# Patient Record
Sex: FEMALE | Race: WHITE | NOT HISPANIC OR LATINO | URBAN - NONMETROPOLITAN AREA
[De-identification: names, ages, dates, MRNs, and addresses within clinical notes are randomized per-mention and may not be internally consistent; named-entity substitution may affect disease eponyms.]

---

## 2021-10-31 PROCEDURE — 99283 EMERGENCY DEPT VISIT LOW MDM: CPT

## 2021-11-01 ENCOUNTER — HOSPITAL ENCOUNTER (EMERGENCY)
Facility: HOSPITAL | Age: 66
Discharge: HOME | End: 2021-11-01
Attending: STUDENT IN AN ORGANIZED HEALTH CARE EDUCATION/TRAINING PROGRAM | Admitting: STUDENT IN AN ORGANIZED HEALTH CARE EDUCATION/TRAINING PROGRAM

## 2021-11-01 ENCOUNTER — APPOINTMENT (OUTPATIENT)
Dept: GENERAL RADIOLOGY | Facility: HOSPITAL | Age: 66
End: 2021-11-01

## 2021-11-01 ENCOUNTER — APPOINTMENT (OUTPATIENT)
Dept: CT IMAGING | Facility: HOSPITAL | Age: 66
End: 2021-11-01

## 2021-11-01 VITALS
HEART RATE: 60 BPM | SYSTOLIC BLOOD PRESSURE: 124 MMHG | TEMPERATURE: 98 F | BODY MASS INDEX: 36.8 KG/M2 | HEIGHT: 62 IN | WEIGHT: 200 LBS | OXYGEN SATURATION: 99 % | RESPIRATION RATE: 20 BRPM | DIASTOLIC BLOOD PRESSURE: 51 MMHG

## 2021-11-01 DIAGNOSIS — S01.81XA LACERATION OF FOREHEAD, INITIAL ENCOUNTER: Primary | ICD-10-CM

## 2021-11-01 DIAGNOSIS — M25.561 ACUTE PAIN OF RIGHT KNEE: ICD-10-CM

## 2021-11-01 PROCEDURE — 73562 X-RAY EXAM OF KNEE 3: CPT

## 2021-11-01 PROCEDURE — 71045 X-RAY EXAM CHEST 1 VIEW: CPT

## 2021-11-01 PROCEDURE — 70450 CT HEAD/BRAIN W/O DYE: CPT

## 2021-11-01 PROCEDURE — 73130 X-RAY EXAM OF HAND: CPT

## 2021-11-01 PROCEDURE — 90471 IMMUNIZATION ADMIN: CPT | Performed by: STUDENT IN AN ORGANIZED HEALTH CARE EDUCATION/TRAINING PROGRAM

## 2021-11-01 PROCEDURE — 90715 TDAP VACCINE 7 YRS/> IM: CPT | Performed by: STUDENT IN AN ORGANIZED HEALTH CARE EDUCATION/TRAINING PROGRAM

## 2021-11-01 PROCEDURE — 25010000002 TETANUS-DIPHTH-ACELL PERTUSSIS 5-2.5-18.5 LF-MCG/0.5 SUSPENSION PREFILLED SYRINGE: Performed by: STUDENT IN AN ORGANIZED HEALTH CARE EDUCATION/TRAINING PROGRAM

## 2021-11-01 PROCEDURE — 72125 CT NECK SPINE W/O DYE: CPT

## 2021-11-01 RX ORDER — CETIRIZINE HYDROCHLORIDE 10 MG/1
10 TABLET ORAL DAILY
COMMUNITY

## 2021-11-01 RX ORDER — ALPRAZOLAM 0.25 MG/1
0.25 TABLET ORAL AS NEEDED
COMMUNITY

## 2021-11-01 RX ORDER — CITALOPRAM 40 MG/1
40 TABLET ORAL DAILY
COMMUNITY

## 2021-11-01 RX ORDER — HYDROCODONE BITARTRATE AND ACETAMINOPHEN 10; 325 MG/1; MG/1
1 TABLET ORAL ONCE
Status: COMPLETED | OUTPATIENT
Start: 2021-11-01 | End: 2021-11-01

## 2021-11-01 RX ORDER — MULTIPLE VITAMINS W/ MINERALS TAB 9MG-400MCG
1 TAB ORAL DAILY
COMMUNITY

## 2021-11-01 RX ORDER — FLUTICASONE PROPIONATE 50 MCG
2 SPRAY, SUSPENSION (ML) NASAL DAILY
COMMUNITY

## 2021-11-01 RX ORDER — NAPROXEN 500 MG/1
500 TABLET ORAL AS NEEDED
COMMUNITY

## 2021-11-01 RX ORDER — OMEPRAZOLE 40 MG/1
40 CAPSULE, DELAYED RELEASE ORAL DAILY
COMMUNITY

## 2021-11-01 RX ADMIN — HYDROCODONE BITARTRATE AND ACETAMINOPHEN 1 TABLET: 10; 325 TABLET ORAL at 01:23

## 2021-11-01 RX ADMIN — TETANUS TOXOID, REDUCED DIPHTHERIA TOXOID AND ACELLULAR PERTUSSIS VACCINE, ADSORBED 0.5 ML: 5; 2.5; 8; 8; 2.5 SUSPENSION INTRAMUSCULAR at 01:24

## 2021-11-01 NOTE — DISCHARGE INSTRUCTIONS
You were evaluated in the ER for a fall. Your workup showed no indication at this time for admission to the hospital. Please use tylenol, ibuprofen, rest, ice, compression with an ACE bandage, and elevation of the leg for symptomatic improvement.     It is VERY IMPORTANT that you follow up (call them to set up an appointment) with your primary care doctor* within the next few days or as soon as possible so that you can be re-evaluated for improvement in your symptoms or for any other questions. If you were prescribed any medications, please take them as directed or call us back with any questions.     Return to the ER within 24-48 hours if you have any new symptoms, worsening symptoms, or any other concerns.    _____  *If you do not have a primary care doctor, follow up with one of the [facility] physician groups below to setup primary care.     If you have trouble making an appointment, please call the [facility] Nurse Line at [phone]    Dr. Zhane Rogers DO, Dr. Madhuri Keating DO, and LADAN Murray  [facility]  [address] [place] [address], [address] [phone]    Dr. Cal Ramírez MD  [facility] - [address] [address], [address], [place] [address] [phone]    Dr. Manjinder Collier DO, Dr. Olegario Pena DO,  LADAN Grajeda, and LADAN Carbajal  [facility] - [address] [address], [address], [place] [address] [phone]     Dr. Beckie Angel MD, and Gilda Ledbetter, ALDAN  [facility] - [address] , [place] [address] [phone]    Dr. Ilir Kimball MD and Dr. Grant Stover MD  [facility] [address], [address] [phone]    Dr. Eliseo Burden MD  [facility] -  [address], [facility], [place], [place], [address] [phone]    Dr. Cleve Bartlett MD  [address], [address] [phone]

## 2021-11-01 NOTE — ED NOTES
"Bacitracin 3x3 adaptic 4x4 and 6\" ace applied to right knee     Marilynn Boucher, RN  11/01/21 0411    "

## 2021-11-05 NOTE — ED PROVIDER NOTES
Subjective   HISTORY OF PRESENT ILLNESS     Viktoria Contreras is a 66 y.o. female, with a past medical history of GERD, depression, who presents to the emergency department today with a small laceration after a fall.  Patient states that she was walking when she missed a step and fell forward and hit her face and scraped her right knee.  States that she did not pass out.  States that she has some mild pain in her head.  States that she has not tried anything for this.  Denies any chest pain, shortness of breath, abdominal pain, dysuria, numbness, tingling.  Denies any other injuries at this time.  Denies any vision changes or any other neurological deficits.      History is obtained from the patient.     Review of systems:  All other review of systems are negative (other than the pertinent positives listed above).    Past Medical History:  Past Medical History:   Diagnosis Date   • Depression    • GERD (gastroesophageal reflux disease)    • Obese        Allergies:  Allergies   Allergen Reactions   • Demerol [Meperidine] Hives   • Glucophage [Metformin] Nausea And Vomiting   • Magnesium-Containing Compounds Dystonia   • Penicillins Seizure   • Sulfa Antibiotics Unknown - High Severity       Past Surgical History:  Past Surgical History:   Procedure Laterality Date   • HYSTERECTOMY         Family History:  History reviewed. No pertinent family history.    Social History:  Social History     Socioeconomic History   • Marital status:    Tobacco Use   • Smoking status: Never Smoker   • Smokeless tobacco: Never Used   Substance and Sexual Activity   • Alcohol use: Not Currently       Home Medications:  Prior to Admission medications    Medication Sig Start Date End Date Taking? Authorizing Provider   cetirizine (zyrTEC) 10 MG tablet Take 10 mg by mouth Daily.   Yes ProviderKadie MD   citalopram (CeleXA) 40 MG tablet Take 40 mg by mouth Daily.   Yes ProviderKadie MD   fluticasone (FLONASE) 50 MCG/ACT nasal  spray 2 sprays into the nostril(s) as directed by provider Daily.   Yes Kadie Gomez MD   multivitamin with minerals tablet tablet Take 1 tablet by mouth Daily.   Yes Kadie Gomez MD   naproxen (NAPROSYN) 500 MG tablet Take 500 mg by mouth As Needed for Mild Pain .   Yes Kadie Gomez MD   omeprazole (priLOSEC) 40 MG capsule Take 40 mg by mouth Daily.   Yes Kadie Gomez MD   ALPRAZolam (XANAX) 0.25 MG tablet Take 0.25 mg by mouth As Needed for Anxiety.    Kadie Gomez MD        Objective   PHYSICAL EXAM     Vitals:   Vitals:    11/01/21 0411   BP: 124/51   Pulse: 60   Resp: 20   Temp: 98 °F (36.7 °C)   SpO2: 99%     Nursing note reviewed.  GENERAL: Well appearing. Not in acute distress. Not ill-appearing. Not diaphoretic. Well nourished.  HEAD: 1cm laceration to right anterior forehead, normocephalic.  EARS: External ears normal in appearance without vesicles, hematomas, or erythema.  EYES: EOM grossly intact. PERRL. No scleral icterus or conjunctival injection.  NOSE: Normal in appearance.  THROAT: Moist. No oropharyngeal swelling, exudates, or erythema.   NECK: ROM normal. No cervical lymphadenopathy. No midline cervical spine tenderness. Supple.  CARDIAC: Rate normal, rhythm regular , no murmurs, 2+ peripheral pulses in all four extremities with normal capillary refill. No murmurs appreciated.  PULMONARY: Work of breathing normal, lungs are clear to auscultation bilaterally without wheezes, crackles, rhonchi.  ABDOMINAL: Abdomen is soft, non-tender, non-distended, no hepatomegaly or splenomegaly. No guarding. No rebound tenderness.  BACK: No mid-line spinal tenderness. No ecchymosis. No CVA tenderness bilaterally. No step-offs or deformities.  MUSCULOSKELETAL: Normal range of motion. No lower extremity edema. Negative carmencita's sign bilaterally. Abrasion to right knee. Small bruise to right pinky.  NEUROLOGIC: Alert and oriented x 3, Moving all four extremities with  normal strength.  SKIN: Warm and dry. No lacerations. No jaundice. No bruising.  PSYCHIATRIC: Mood and affect are normal.     LAB RESULTS     Lab Results (last 72 hours)     ** No results found for the last 72 hours. **        RADIOLOGY RESULTS     XR Hand 3+ View Right    Result Date: 11/1/2021  Narrative: XR HAND 3+ VW RIGHT- 11/1/2021 1:42 AM CDT  HISTORY: pinky finger injury  COMPARISON: None  FINDINGS: Frontal, lateral and oblique radiographs of the right hand were provided for review.  There is no acute fracture or joint subluxation. The soft tissues are normal in appearance. The joint spaces are maintained.      Impression: 1. No acute osseous injury or malalignment. This report was finalized on 11/01/2021 07:52 by Dr Mitch Espinal, .    XR Knee 3 View Right    Result Date: 11/1/2021  Narrative: XR KNEE 3 VW RIGHT- 11/1/2021 1:41 AM CDT  HISTORY: fall abrasion  COMPARISON: None  FINDINGS: Frontal, lateral and notch views of the right knee were obtained.  There is no fracture or joint subluxation. No significant joint space narrowing is noted. Loss of joint space at the knee. Tibial spine osteophytes.   No gross soft tissue abnormality is visualized.      Impression: 1. No acute osseous injury or malalignment. 2. Early osteoarthritis at the knee. This report was finalized on 11/01/2021 07:53 by Dr Mitch Espinal, .    CT Head Without Contrast    Result Date: 11/1/2021  Narrative: CT HEAD WO CONTRAST- 11/1/2021 1:59 AM CDT  HISTORY: head trauma, fall  COMPARISON: None  DOSE LENGTH PRODUCT: 762 mGy cm. Automated exposure control was also utilized to decrease patient radiation dose.  TECHNIQUE: Axial images of brain obtained without contrast  FINDINGS:  The ventricles are normal in size and configuration. There is no intracranial hemorrhage or mass effect. There are no acute signs of ischemia. No extra-axial hematoma or subarachnoid hemorrhage. The cerebellar tonsils project at the foramen magnum.  Mucosal  thickening of the ethmoid and frontal sinuses. Mastoid air cells are well-aerated. Bony calvarium appears intact.      Impression: 1. No acute intracranial abnormality. Chronic mucosal thickening of the ethmoid and frontal sinuses.  Comments: A preliminary report is issued to the ER by the [facility] radiology service. I agree with this preliminary impression. This report was finalized on 11/01/2021 07:22 by Dr. Gretchen Hughes MD.    CT Cervical Spine Without Contrast    Result Date: 11/1/2021  Narrative: CT CERVICAL SPINE WO CONTRAST- 11/1/2021 1:59 AM CDT  HISTORY: Neck trauma (Age >= 65y)  COMPARISON: None  DOSE LENGTH PRODUCT: 436 mGy cm. Automated exposure control was also utilized to decrease patient radiation dose.  TECHNIQUE: Axial images of cervical spine are obtained with sagittal coronal reconstructions  FINDINGS:  The alignment of the cervical spine is appropriate. There is mild to moderate endplate spurring with mild degenerative disc narrowing greatest at C5/C6 and C6/C7. Early uncinate process hypertrophy at C5-C7. No cervical vertebral fracture. No prominent central cervical canal stenosis or neural foramen narrowing. Mild neural foramen narrowing on the right at the C4/C5 level.  The paravertebral soft tissues are unremarkable.      Impression: 1. Degenerative changes involving the cervical spine with no acute fracture or traumatic malalignment.  Comments: A preliminary report is issued to the ER by the [facility] radiology service. I agree with this preliminary impression.  This report was finalized on 11/01/2021 07:25 by Dr. Gretchen Hughes MD.    XR Chest 1 View    Result Date: 11/1/2021  Narrative: Frontal upright radiograph of the chest 11/1/2021 1:43 AM CDT  HISTORY: Fall  COMPARISON: None.  FINDINGS:  No lung consolidation. No pleural effusion or pneumothorax. The cardiomediastinal silhouette and pulmonary vascularity are within normal limits. The osseous structures and surrounding soft tissues  demonstrate no acute abnormality.      Impression: 1. No radiographic evidence of acute cardiopulmonary process.   This report was finalized on 11/01/2021 07:51 by Dr Mitch Espinal, .       PROCEDURES PERFORMED     Laceration Repair    Date/Time: 11/5/2021 1:20 AM  Performed by: Marisol Buchanan MD  Authorized by: Marisol Buchanan MD     Consent:     Consent obtained:  Verbal    Consent given by:  Patient and parent    Risks discussed:  Infection, need for additional repair, nerve damage, poor wound healing, poor cosmetic result, pain and retained foreign body    Alternatives discussed:  No treatment  Laceration details:     Location:  Scalp    Scalp location:  Frontal    Length (cm):  1    Depth (mm):  5  Repair type:     Repair type:  Simple  Pre-procedure details:     Preparation:  Patient was prepped and draped in usual sterile fashion  Exploration:     Hemostasis achieved with:  Direct pressure    Wound exploration: wound explored through full range of motion      Wound extent: no areolar tissue violation noted, no fascia violation noted, no foreign bodies/material noted, no muscle damage noted, no nerve damage noted, no tendon damage noted, no underlying fracture noted and no vascular damage noted    Treatment:     Area cleansed with:  Soap and water    Amount of cleaning:  Standard    Irrigation solution:  Sterile saline    Irrigation volume:  100  Skin repair:     Repair method:  Tissue adhesive  Approximation:     Approximation:  Close  Post-procedure details:     Dressing:  Open (no dressing)    Patient tolerance of procedure:  Tolerated well, no immediate complications        MEDICAL DECISION MAKING / ED COURSE   This is a 66yoF presenting with a laceration after a fall. Patient arrived hemodynamically stable and was afebrile. Neurological exam without any focal deficits. Patient was placed on the monitor and IV access was established.     Differentials include, but are not limited to ICH, mechanical fall,  facial laceration. Plan to obtain CT head w/o contrast, CT cervical spine, x-ray right hand, x-ray right knee, control pain, and reassess.    Presentation not consistent with other acute, emergent causes of headache at this time. No red flags for SAH (based on history) and headache is not the worst headache of the patient's life. No evidence of meningismus on exam and no photophobia. No neck stiffness or overlying skin changes. Low suspicion for acute angle closure glaucoma at this time given lack of pupillary findings. Low suspicion for temporal arteritis as there is no bulging temporal artery, pain is not localized to temporal area, and patient does not have any vision loss or history of vasculitis. Low suspicion for CRAO/CRVO as the patient did not have painless vision loss. Low suspicion for ACS as the patient does not have any associated chest pain or shortness of breath and has a non-suspicious history of presenting illness. No gait disturbance. No diplopia, dysarthria, or dysphagia on exam. Tetanus was updated today. Laceration repaired with dermabond.    The workup was reviewed and found to have no acutely actionable abnormalities. She was feeling better and was very jovial. I reassessed the patient and discussed the findings of the work up so far. I explained my impression of the workup to her and addressed all of her questions regarding the emergency department evaluation, diagnosis, and treatment plan in plain and simple language that she was able to understand.     She voiced agreement with the plan of care so far and had no further questions. I told her that there is always some diagnostic uncertainty in the ER and that her work up, physical exam, and even her current presentation may not always reveal other underlying conditions. I also went over the fact that her condition may change or show itself after being discharged. She expressed understanding and agreed that there are reasonable limitations  with the practice of emergency medicine.    I gave her return precautions and told her to return to the emergency department within 24 - 48hrs if she has any new, worsening, or concerning symptoms. I told her that it is VERY IMPORTANT that she follows up (by calling to set up an appointment) with her primary care doctor within the next few days or as soon as reasonably possible so that she can be re-evaluated for improvement in her symptoms or for any other questions. She verbalized understanding of these instructions.     She was discharged in stable condition and was observed ambulating out of the ER.         ED course / additional discussions:       Amount and/or complexity of data reviewed:  • Clinical lab tests: none  • Tests in the radiology section of CPT® : ordered and reviewed  • Obtain history from someone other than the patient: no  • Discuss the patient with another provider: no      Risk of significant complications, morbidity, and/or mortality:  •  Presenting problem: low  •  Diagnostic procedures: low  •  Management options: low    Critical care time: none  Patient progress: stable    MEDICATIONS ADMINISTERED     Medications   Tetanus-Diphth-Acell Pertussis (BOOSTRIX) injection 0.5 mL (0.5 mL Intramuscular Given 11/1/21 0124)   HYDROcodone-acetaminophen (NORCO)  MG per tablet 1 tablet (1 tablet Oral Given 11/1/21 0123)     FINAL DIAGNOSIS / IMPRESSION     Final diagnoses:   Laceration of forehead, initial encounter   Acute pain of right knee     FINAL DISPOSITION     ED Disposition     ED Disposition Condition Comment    Discharge Stable         FOLLOW UP / MEDICATION CHANGES     Provider, No Known  [facility]  [place] [address]  [phone]    Call in 1 day  As needed, If symptoms worsen, For wound re-check         Medication List      No changes were made to your prescriptions during this visit.              Marisol Buchanan MD  11/05/21 0119       Marisol Buchanan MD  11/05/21 0121